# Patient Record
Sex: MALE | Race: WHITE | Employment: FULL TIME | URBAN - METROPOLITAN AREA
[De-identification: names, ages, dates, MRNs, and addresses within clinical notes are randomized per-mention and may not be internally consistent; named-entity substitution may affect disease eponyms.]

---

## 2021-05-13 ENCOUNTER — HOSPITAL ENCOUNTER (EMERGENCY)
Facility: CLINIC | Age: 66
Discharge: HOME | End: 2021-05-13
Attending: EMERGENCY MEDICINE | Admitting: EMERGENCY MEDICINE
Payer: OTHER MISCELLANEOUS

## 2021-05-13 VITALS
SYSTOLIC BLOOD PRESSURE: 167 MMHG | TEMPERATURE: 98.1 F | DIASTOLIC BLOOD PRESSURE: 86 MMHG | OXYGEN SATURATION: 98 % | RESPIRATION RATE: 18 BRPM | HEART RATE: 61 BPM

## 2021-05-13 DIAGNOSIS — H10.213 CHEMICAL CONJUNCTIVITIS OF BOTH EYES: ICD-10-CM

## 2021-05-13 PROCEDURE — 250N000009 HC RX 250

## 2021-05-13 PROCEDURE — 99283 EMERGENCY DEPT VISIT LOW MDM: CPT

## 2021-05-13 RX ORDER — TETRACAINE HYDROCHLORIDE 5 MG/ML
SOLUTION OPHTHALMIC
Status: COMPLETED
Start: 2021-05-13 | End: 2021-05-13

## 2021-05-13 RX ADMIN — FLUORESCEIN SODIUM: 1 STRIP OPHTHALMIC at 07:30

## 2021-05-13 RX ADMIN — TETRACAINE HYDROCHLORIDE: 5 SOLUTION OPHTHALMIC at 07:30

## 2021-05-13 ASSESSMENT — ENCOUNTER SYMPTOMS
EYE ITCHING: 1
EYE REDNESS: 1
PHOTOPHOBIA: 0
EYE DISCHARGE: 0
EYE PAIN: 1

## 2021-05-13 NOTE — ED PROVIDER NOTES
History   Chief Complaint:  Eye Problem       HPI   Sanjeev Lentz is a 65 year old male who presents with an eye problem. The patient reports that he was working with chlorine-125 this morning, (sodium hypochlorite 12.5%) when it splashed in his face. He had safety glasses on when this occurred, but liquid still entered his eyes peripherally. His right eye is more irritated than the left. A water eyewash was used for 15-20 minutes after the incident.     Review of Systems   Eyes: Positive for pain, redness and itching. Negative for photophobia and discharge.   All other systems reviewed and are negative.        Allergies:  No known allergies    Medications:  No known current medicatios    Past Medical History:    No known past medical history    Past Surgical History:    No known past surgical history    Family History:    No known past family history    Social History:  Accompanied by a coworker  Arrives via car    Physical Exam     Patient Vitals for the past 24 hrs:   BP Temp Pulse Resp SpO2   05/13/21 0710 (!) 167/86 98.1  F (36.7  C) 61 18 98 %       Physical Exam     Constitutional: Patient is well appearing. No distress.  Head: Atraumatic.  Mouth/Throat: Oropharynx is clear and moist. No oropharyngeal exudate.  Eyes: Visual acuity completely intact. Bilateral slight bulbar injection. No fluorescin uptake. PH 7 bilateral eyes.   Neck: Normal range of motion. Neck supple.   Cardiovascular: Normal rate, regular rhythm, normal heart sounds and intact distal pulses.   Pulmonary/Chest: Breath sounds normal. No respiratory distress.  Abdominal: Soft. Bowel sounds are normal. No distension. No tenderness. No rebound or guarding.   Musculoskeletal: Normal range of motion. No edema or tenderness.   Neurological: Alert and orientated to person, place, and time. No observable focal neuro deficit  Skin: Warm and dry. No rash noted. Not diaphoretic.     Emergency Department Course         Emergency Department  Course:    Reviewed:  I reviewed nursing notes, vitals and past medical history    Assessments:  07 I obtained history and examined the patient as noted above. All questions are answered. Patient is ready for discharge.    Interventions:  730 Pontocaine  730 Ful-tiffanie    Disposition:  The patient was discharged to home.     Impression & Plan         Medical Decision Makin year old male who presents with symptoms of chemical conjunctivitis.  Not direct splash.  Copious irrigation PTA.  Normal pH >1 hour from injury.  Visual acuity completley intact.  Back with strict return and  F/u instructions.      Diagnosis:    ICD-10-CM    1. Chemical conjunctivitis of both eyes  H10.213        Discharge Medications:  New Prescriptions    No medications on file       Scribe Disclosure:  I, José Miguel Rondon, am serving as a scribe at 7:18 AM on 2021 to document services personally performed by Chivo Arredondo MD based on my observations and the provider's statements to me.              Chivo Arredondo MD  [phone]

## 2021-05-13 NOTE — ED TRIAGE NOTES
Patient presents with complaints of chemicals splashing in eyes. Patient was working with Chlor-125 when it splashed back at his face. Patient was wearing goggles at the time and was able was his eyes out. ABC intact without need for intervention at this time.

## 2021-05-19 ENCOUNTER — OFFICE VISIT (OUTPATIENT)
Dept: OPHTHALMOLOGY | Facility: CLINIC | Age: 66
End: 2021-05-19
Attending: OPHTHALMOLOGY
Payer: OTHER MISCELLANEOUS

## 2021-05-19 DIAGNOSIS — D31.31 CHOROIDAL NEVUS OF RIGHT EYE: ICD-10-CM

## 2021-05-19 DIAGNOSIS — H11.31 SUBCONJUNCTIVAL HEMATOMA, RIGHT: ICD-10-CM

## 2021-05-19 DIAGNOSIS — Q14.1: ICD-10-CM

## 2021-05-19 DIAGNOSIS — T26.92XS: Primary | ICD-10-CM

## 2021-05-19 DIAGNOSIS — Z98.890 H/O LASER ASSISTED IN SITU KERATOMILEUSIS: ICD-10-CM

## 2021-05-19 PROCEDURE — 99203 OFFICE O/P NEW LOW 30 MIN: CPT | Mod: GC | Performed by: STUDENT IN AN ORGANIZED HEALTH CARE EDUCATION/TRAINING PROGRAM

## 2021-05-19 RX ORDER — CARBOXYMETHYLCELLULOSE SODIUM 5 MG/ML
1 SOLUTION/ DROPS OPHTHALMIC 4 TIMES DAILY PRN
Status: ACTIVE | OUTPATIENT
Start: 2021-05-19

## 2021-05-19 ASSESSMENT — VISUAL ACUITY
METHOD: SNELLEN - LINEAR
OD_SC: 20/20
OS_SC: 20/20

## 2021-05-19 ASSESSMENT — TONOMETRY
IOP_METHOD: TONOPEN
OS_IOP_MMHG: 9
OD_IOP_MMHG: 10

## 2021-05-19 ASSESSMENT — CONF VISUAL FIELD
OD_NORMAL: 1
OS_NORMAL: 1

## 2021-05-19 ASSESSMENT — CUP TO DISC RATIO
OS_RATIO: 0.3
OD_RATIO: 0.3

## 2021-05-19 ASSESSMENT — SLIT LAMP EXAM - LIDS
COMMENTS: NORMAL
COMMENTS: NORMAL

## 2021-05-19 ASSESSMENT — EXTERNAL EXAM - LEFT EYE: OS_EXAM: NORMAL

## 2021-05-19 ASSESSMENT — EXTERNAL EXAM - RIGHT EYE: OD_EXAM: NORMAL

## 2021-05-19 NOTE — PATIENT INSTRUCTIONS
Artificial tears - Some brands include Refresh, Genteal, Systane   These are available over the counter and should be used a minimum of 4 times a day and more frequently if needed

## 2021-05-19 NOTE — PROGRESS NOTES
HPI:  Sanjeev Lentz is a 65 year old male HTN who presents for follow-up in the setting of a chemical injury right eye>OS.     Patient was seen in the ED 5/13 due to sodium chlorine-125 (sodium hypochlorite 12.5%) when it splashed in his face. He was at work when a pump failed and caused fluid to spill over his face and body - no skin burns or skin irritation from this. He had safety glasses on when this occurred, but liquid still entered his eyes peripherally. His right eye is more irritated than the left. A water eyewash was used for 15-20 minutes after the incident. In the ED he was found to have normal visual acuity and ph of 7.     He reports that he had pain and blurred vision with epiphora in this setting more so in the right eye > left. He felt the eye felt gritty and kwame initially. Now he reports that everything is almost back to normal in the eyes. He does report that it still feels mildly irritation.     He reports that his vision is doing well now. No flashes of lights, no floaters. He reports redness and epiphora has resolved. Was not provided eye drop to use in this setting.    Last complete eye exam was 3-4 years ago with no issues - hx of lasik recently.    POH - Hx of LASIK surgery (December, 2020)  - LASIK plus in [place]. No other ocular issues or hx of ocular surgeries.   - Safety glasses  - Uses readers   GTTS - No   PMH - HTN (On Lisinopril), Depression (Fluoxetine), HLD (Rosuvastatin).   FH - No FHx of ocular problems.     Assessment & Plan       ICD-10-CM    1. Chemical injury of eye, left, sequela  T26.92XS    2. H/O laser assisted in situ keratomileusis  Z98.890    3. Subconjunctival hematoma, right  H11.31    4. Retinal pigmentation on examination  Q14.1    5. 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 Was seen at ED and found to have PH of 7 after copious irrigation. Today on exam, patient's examination is reassuring with  only trace injection present. Cornea intact and well without signs of damage. PH today is 7.  - DFE significant for chorioretinal pigmentary changes with areas of atrophic patches - these appear inactive without vitreitis. 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 Furthermore, there is a choroidal nevi present without any concerning findings - 3 DD in SN aspect of fundus.     - Recommend frequent usage of artificial tears at this juncture -- Start AT QID.   - Recommend annual eye examinations - ok to be closer to home  - Discussed urgent evaluation precautions to be aware of with worsening vision, eye pain, redness or discharge, flashes of lights or floaters.     -----------------------------------------------------------------------------------    Patient disposition:   Return in about 1 year (around 5/19/2022) for Follow up.    Leonidas Mijares MD  Department of Ophthalmology  Pager: [phone]    Teaching statement:  Complete documentation of historical and exam elements from today's encounter can be found in the full encounter summary report (not reduplicated in this progress note). I personally obtained the chief complaint(s) and history of present illness.  I confirmed and edited as necessary the review of systems, past medical/surgical history, family history, social history, and examination findings as documented by others; and I examined the patient myself. I personally reviewed the relevant tests, images, and reports as documented above.     I formulated and edited as necessary the assessment and plan and discussed the findings and management plan with the patient and family.      Anne Rivas MD  Comprehensive Ophthalmology & Ocular Pathology  Department of Ophthalmology and Visual Neurosciences  [email or web address]  Pager [phone]